# Patient Record
Sex: MALE | Race: WHITE | URBAN - METROPOLITAN AREA
[De-identification: names, ages, dates, MRNs, and addresses within clinical notes are randomized per-mention and may not be internally consistent; named-entity substitution may affect disease eponyms.]

---

## 2017-01-26 PROBLEM — K21.9 ESOPHAGEAL REFLUX: Status: ACTIVE | Noted: 2017-01-26

## 2017-01-28 ENCOUNTER — OFFICE VISIT (OUTPATIENT)
Dept: FAMILY MEDICINE CLINIC | Facility: CLINIC | Age: 37
End: 2017-01-28

## 2017-01-28 VITALS
SYSTOLIC BLOOD PRESSURE: 136 MMHG | DIASTOLIC BLOOD PRESSURE: 98 MMHG | TEMPERATURE: 98 F | WEIGHT: 269 LBS | BODY MASS INDEX: 34.52 KG/M2 | HEIGHT: 74 IN | HEART RATE: 96 BPM

## 2017-01-28 DIAGNOSIS — F17.200 TOBACCO USE DISORDER: ICD-10-CM

## 2017-01-28 DIAGNOSIS — I10 HTN (HYPERTENSION), BENIGN: Primary | ICD-10-CM

## 2017-01-28 PROCEDURE — 99214 OFFICE O/P EST MOD 30 MIN: CPT | Performed by: FAMILY MEDICINE

## 2017-01-28 PROCEDURE — 93000 ELECTROCARDIOGRAM COMPLETE: CPT | Performed by: FAMILY MEDICINE

## 2017-01-28 RX ORDER — METOPROLOL SUCCINATE 25 MG/1
25 TABLET, EXTENDED RELEASE ORAL DAILY
Qty: 30 TABLET | Refills: 2 | Status: SHIPPED | OUTPATIENT
Start: 2017-01-28 | End: 2017-02-17 | Stop reason: DRUGHIGH

## 2017-01-28 NOTE — PROGRESS NOTES
[address]  PROGRESS NOTE  Chief Complaint:   Patient presents with:  Blood Pressure      HPI:   This is a 29year old male coming in for check BP  June 2016 patient was in ED for allergic rx- bp elevated at that time.   Patient did not s itching, skin lesion, or excessive skin dryness. CARDIOVASCULAR:  Denies chest pain, chest pressure, chest discomfort, palpitations, edema, dyspnea on exertion or at rest.  RESPIRATORY:  Denies shortness of breath, wheezing, cough or sputum.   Lloyd Fish Soft, nondistended, nontender, bowel sounds normal in all 4 quadrants, no masses, no hepatosplenomegaly. EXTREMITIES:  No edema, no cyanosis, no clubbing, FROM  ASSESSMENT AND PLAN:   1. HTN (hypertension), benign  New diagnosis.   Patient EKG which was Allergies:  Ciprofloxacin           Pain  Sulfamethoxazole W/*        Comment:Other reaction(s): hives  Penicillins                PHYSICAL EXAM:   Physical Exam           ASSESSMENT/PLAN:   Htn (hypertension), benign  (primary encounter diagnosis)  To

## 2017-01-28 NOTE — PATIENT INSTRUCTIONS
Pt rec to return for fasting labs  Pt rec to start BP medication  Recheck BP in 2-4 weeks with pcp- Dr. Geraldine Rai

## 2017-02-01 ENCOUNTER — TELEPHONE (OUTPATIENT)
Dept: FAMILY MEDICINE CLINIC | Facility: CLINIC | Age: 37
End: 2017-02-01

## 2017-02-01 DIAGNOSIS — I10 HTN (HYPERTENSION), BENIGN: Primary | ICD-10-CM

## 2017-02-01 NOTE — TELEPHONE ENCOUNTER
Patient needs appointment. I am uncertain what labs this patient needs ;  I have not ever seen him. I am uncertain what labs he is thinking he needs or why he scheduled labs. Old chart reviewed;   Patient has never been seen here for a physical

## 2017-02-03 NOTE — TELEPHONE ENCOUNTER
Ok for fasting labs. Pt was instructed to return in 2 weeks for f;u of BP- Please clarify pcp with him ( Dr. Stephenie Armstrong or Dr. Giselle Rankin).

## 2017-02-03 NOTE — TELEPHONE ENCOUNTER
Patient informed of the below recommendations. States he was just in last week regarding his BP and was informed per Dr. JAH Mcrae to return for fasting labs. Patient is going to keep his lab appt for tomorrow unless he hears otherwise from us.   Please advis

## 2017-02-04 ENCOUNTER — LAB ENCOUNTER (OUTPATIENT)
Dept: LAB | Age: 37
End: 2017-02-04
Attending: FAMILY MEDICINE
Payer: COMMERCIAL

## 2017-02-04 DIAGNOSIS — I10 HTN (HYPERTENSION), BENIGN: ICD-10-CM

## 2017-02-04 LAB
ALBUMIN SERPL-MCNC: 4 G/DL (ref 3.5–4.8)
ALP LIVER SERPL-CCNC: 67 U/L (ref 45–117)
ALT SERPL-CCNC: 47 U/L (ref 17–63)
AST SERPL-CCNC: 27 U/L (ref 15–41)
BASOPHILS # BLD AUTO: 0.13 X10(3) UL (ref 0–0.1)
BASOPHILS NFR BLD AUTO: 0.7 %
BILIRUB SERPL-MCNC: 0.4 MG/DL (ref 0.1–2)
BILIRUB UR QL STRIP.AUTO: NEGATIVE
BUN BLD-MCNC: 7 MG/DL (ref 8–20)
CALCIUM BLD-MCNC: 8.9 MG/DL (ref 8.3–10.3)
CHLORIDE: 101 MMOL/L (ref 101–111)
CHOLEST SMN-MCNC: 193 MG/DL (ref ?–200)
CLARITY UR REFRACT.AUTO: CLEAR
CO2: 27 MMOL/L (ref 22–32)
CREAT BLD-MCNC: 0.85 MG/DL (ref 0.7–1.3)
EOSINOPHIL # BLD AUTO: 0.35 X10(3) UL (ref 0–0.3)
EOSINOPHIL NFR BLD AUTO: 1.8 %
ERYTHROCYTE [DISTWIDTH] IN BLOOD BY AUTOMATED COUNT: 12.2 % (ref 11.5–16)
GLUCOSE BLD-MCNC: 82 MG/DL (ref 70–99)
GLUCOSE UR STRIP.AUTO-MCNC: NEGATIVE MG/DL
HCT VFR BLD AUTO: 47.8 % (ref 37–53)
HDLC SERPL-MCNC: 31 MG/DL (ref 45–?)
HDLC SERPL: 6.23 {RATIO} (ref ?–4.97)
HGB BLD-MCNC: 16.5 G/DL (ref 13–17)
IMMATURE GRANULOCYTE COUNT: 0.1 X10(3) UL (ref 0–1)
IMMATURE GRANULOCYTE RATIO %: 0.5 %
KETONES UR STRIP.AUTO-MCNC: NEGATIVE MG/DL
LDLC SERPL CALC-MCNC: 115 MG/DL (ref ?–130)
LEUKOCYTE ESTERASE UR QL STRIP.AUTO: NEGATIVE
LYMPHOCYTES # BLD AUTO: 5.33 X10(3) UL (ref 0.9–4)
LYMPHOCYTES NFR BLD AUTO: 28.2 %
M PROTEIN MFR SERPL ELPH: 8 G/DL (ref 6.1–8.3)
MCH RBC QN AUTO: 32.1 PG (ref 27–33.2)
MCHC RBC AUTO-ENTMCNC: 34.5 G/DL (ref 31–37)
MCV RBC AUTO: 93 FL (ref 80–99)
MONOCYTES # BLD AUTO: 1.02 X10(3) UL (ref 0.1–0.6)
MONOCYTES NFR BLD AUTO: 5.4 %
NEUTROPHIL ABS PRELIM: 12 X10 (3) UL (ref 1.3–6.7)
NEUTROPHILS # BLD AUTO: 12 X10(3) UL (ref 1.3–6.7)
NEUTROPHILS NFR BLD AUTO: 63.4 %
NITRITE UR QL STRIP.AUTO: NEGATIVE
NONHDLC SERPL-MCNC: 162 MG/DL (ref ?–130)
PH UR STRIP.AUTO: 6 [PH] (ref 4.5–8)
PLATELET # BLD AUTO: 332 10(3)UL (ref 150–450)
POTASSIUM SERPL-SCNC: 3.7 MMOL/L (ref 3.6–5.1)
PROT UR STRIP.AUTO-MCNC: NEGATIVE MG/DL
RBC # BLD AUTO: 5.14 X10(6)UL (ref 4.3–5.7)
RED CELL DISTRIBUTION WIDTH-SD: 42 FL (ref 35.1–46.3)
SODIUM SERPL-SCNC: 136 MMOL/L (ref 136–144)
SP GR UR STRIP.AUTO: <1.005 (ref 1–1.03)
TRIGLYCERIDES: 234 MG/DL (ref ?–150)
TSI SER-ACNC: 2.87 MIU/ML (ref 0.35–5.5)
UROBILINOGEN UR STRIP.AUTO-MCNC: <2 MG/DL
VLDL: 47 MG/DL (ref 5–40)
WBC # BLD AUTO: 18.9 X10(3) UL (ref 4–13)

## 2017-02-04 PROCEDURE — 85025 COMPLETE CBC W/AUTO DIFF WBC: CPT

## 2017-02-04 PROCEDURE — 80053 COMPREHEN METABOLIC PANEL: CPT

## 2017-02-04 PROCEDURE — 84443 ASSAY THYROID STIM HORMONE: CPT

## 2017-02-04 PROCEDURE — 81001 URINALYSIS AUTO W/SCOPE: CPT

## 2017-02-04 PROCEDURE — 80061 LIPID PANEL: CPT

## 2017-02-08 ENCOUNTER — TELEPHONE (OUTPATIENT)
Dept: FAMILY MEDICINE CLINIC | Facility: CLINIC | Age: 37
End: 2017-02-08

## 2017-02-08 NOTE — TELEPHONE ENCOUNTER
Future Appointments  Date Time Provider Jm Viramontes   2/17/2017 1:30 PM Stephanie Silva MD [facility] [place]       Pt states he had 2 capped crowns pulled approx 1 month ago- states that  he is still having problems with the L upper side.

## 2017-02-08 NOTE — TELEPHONE ENCOUNTER
----- Message from Suni Jules MD sent at 2/6/2017  3:24 PM CST -----  Labs reviewed  Cbc- with elevated WBC- It is unclear to me why this is. Please check with pt re any illness. Consider acute care appt.   Ua, chem panel, tsh normal  Lipids- elevat

## 2017-02-09 NOTE — TELEPHONE ENCOUNTER
Called [facility]- answering service answered, detailed message relayed re: pt c/o and elevated WBC. Asked for fax # to send CBC, they do not have fax, only e-mail transmission.  They will send message to- Dr. Roderick Gonsales- requested that dentist follow up with

## 2017-02-09 NOTE — TELEPHONE ENCOUNTER
Pt states he was not able to get in with dentist until next Friday. Pt urged to try to get sooner appt. CR informed. Dentist phone #[phone].

## 2017-02-10 NOTE — TELEPHONE ENCOUNTER
Rodriguez Lara states that he did end up getting a call from the dentist yesterday and was then referred to an oral surgeon and was seen.   Initally pt states he was told there was nothing wrong, but today pt states he woke up w/ an abscess in the area pt was concerne

## 2017-02-17 ENCOUNTER — LAB ENCOUNTER (OUTPATIENT)
Dept: LAB | Age: 37
End: 2017-02-17
Attending: FAMILY MEDICINE
Payer: COMMERCIAL

## 2017-02-17 ENCOUNTER — OFFICE VISIT (OUTPATIENT)
Dept: FAMILY MEDICINE CLINIC | Facility: CLINIC | Age: 37
End: 2017-02-17

## 2017-02-17 VITALS
TEMPERATURE: 97 F | HEIGHT: 74 IN | DIASTOLIC BLOOD PRESSURE: 94 MMHG | WEIGHT: 271.63 LBS | HEART RATE: 88 BPM | RESPIRATION RATE: 16 BRPM | BODY MASS INDEX: 34.86 KG/M2 | SYSTOLIC BLOOD PRESSURE: 130 MMHG

## 2017-02-17 DIAGNOSIS — I10 HTN (HYPERTENSION), BENIGN: Primary | ICD-10-CM

## 2017-02-17 DIAGNOSIS — K04.7 DENTAL ABSCESS: ICD-10-CM

## 2017-02-17 DIAGNOSIS — F17.200 TOBACCO USE DISORDER: ICD-10-CM

## 2017-02-17 LAB
BASOPHILS # BLD AUTO: 0.1 X10(3) UL (ref 0–0.1)
BASOPHILS NFR BLD AUTO: 0.6 %
EOSINOPHIL # BLD AUTO: 0.34 X10(3) UL (ref 0–0.3)
EOSINOPHIL NFR BLD AUTO: 2 %
ERYTHROCYTE [DISTWIDTH] IN BLOOD BY AUTOMATED COUNT: 12.7 % (ref 11.5–16)
HCT VFR BLD AUTO: 47.6 % (ref 37–53)
HGB BLD-MCNC: 16.1 G/DL (ref 13–17)
IMMATURE GRANULOCYTE COUNT: 0.09 X10(3) UL (ref 0–1)
IMMATURE GRANULOCYTE RATIO %: 0.5 %
LYMPHOCYTES # BLD AUTO: 3.91 X10(3) UL (ref 0.9–4)
LYMPHOCYTES NFR BLD AUTO: 23.4 %
MCH RBC QN AUTO: 32.1 PG (ref 27–33.2)
MCHC RBC AUTO-ENTMCNC: 33.8 G/DL (ref 31–37)
MCV RBC AUTO: 95 FL (ref 80–99)
MONOCYTES # BLD AUTO: 0.86 X10(3) UL (ref 0.1–0.6)
MONOCYTES NFR BLD AUTO: 5.1 %
NEUTROPHIL ABS PRELIM: 11.41 X10 (3) UL (ref 1.3–6.7)
NEUTROPHILS # BLD AUTO: 11.41 X10(3) UL (ref 1.3–6.7)
NEUTROPHILS NFR BLD AUTO: 68.4 %
PLATELET # BLD AUTO: 335 10(3)UL (ref 150–450)
RBC # BLD AUTO: 5.01 X10(6)UL (ref 4.3–5.7)
RED CELL DISTRIBUTION WIDTH-SD: 43.4 FL (ref 35.1–46.3)
WBC # BLD AUTO: 16.7 X10(3) UL (ref 4–13)

## 2017-02-17 PROCEDURE — 85025 COMPLETE CBC W/AUTO DIFF WBC: CPT

## 2017-02-17 PROCEDURE — 99214 OFFICE O/P EST MOD 30 MIN: CPT | Performed by: FAMILY MEDICINE

## 2017-02-17 RX ORDER — METOPROLOL SUCCINATE 50 MG/1
50 TABLET, EXTENDED RELEASE ORAL DAILY
Qty: 90 TABLET | Refills: 0 | Status: SHIPPED | OUTPATIENT
Start: 2017-02-17 | End: 2017-05-16

## 2017-02-17 RX ORDER — CLINDAMYCIN HYDROCHLORIDE 300 MG/1
300 CAPSULE ORAL 3 TIMES DAILY
COMMUNITY
Start: 2017-02-12 | End: 2017-05-16 | Stop reason: ALTCHOICE

## 2017-02-17 NOTE — PROGRESS NOTES
Jaskaran Gore is a 40year old male. HPI:   Patient presents for recheck of his hypertension.  Pt has been taking medications as instructed, no medication side effects, no home BP monitoring   Pt noticing gettting red in face less, less prominence of n developed, well nourished,in no apparent distress  SKIN: no rashes,no suspicious lesions  HEENT: atraumatic, normocephalic,ears and throat are clear- left upper jaw with healing wound - empty tooth socket.   NECK: supple,no adenopathy,no bruits  LUNGS: renetta

## 2017-02-17 NOTE — PATIENT INSTRUCTIONS
F.u cbc today    Increase BP med to 50 mg metoprolol a day    dw pt weaning/ quitting smoking    Encourage regular sleep, exercise, diet for weight reduction  Planning to Quit Smoking  Your healthcare provider may have told you that you need to give up tob smoking. Talk with your healthcare provider befor using these products.      Quit-smoking products  Many products can help you quit smoking. Some are prescription medicines that help curb your cravings and withdrawal symptoms.  Other products slowly lessen high blood pressure and high cholesterol that put you at increased risk for cardiovascular disease. You’ll have the best chance of success if you join a stop-smoking group and have the support of your doctor, family and friends.      Line up help  · Ask

## 2017-02-18 ENCOUNTER — TELEPHONE (OUTPATIENT)
Dept: FAMILY MEDICINE CLINIC | Facility: CLINIC | Age: 37
End: 2017-02-18

## 2017-02-18 DIAGNOSIS — D72.829 LEUKOCYTOSIS, UNSPECIFIED TYPE: Primary | ICD-10-CM

## 2017-03-21 ENCOUNTER — TELEPHONE (OUTPATIENT)
Dept: FAMILY MEDICINE CLINIC | Facility: CLINIC | Age: 37
End: 2017-03-21

## 2017-03-24 NOTE — TELEPHONE ENCOUNTER
Future Appointments  Date Time Provider Jm Ana M   5/19/2017 4:15 PM Clarence Cabrera MD [facility] [place]       Left message.

## 2017-03-27 NOTE — TELEPHONE ENCOUNTER
Future Appointments  Date Time Provider Jm Viramontes   5/19/2017 4:15 PM Alla Owens MD [facility] [place]     Left message for pt to return my call.

## 2017-04-08 ENCOUNTER — LAB ENCOUNTER (OUTPATIENT)
Dept: LAB | Age: 37
End: 2017-04-08
Attending: FAMILY MEDICINE
Payer: COMMERCIAL

## 2017-04-08 DIAGNOSIS — D72.829 LEUKOCYTOSIS, UNSPECIFIED TYPE: ICD-10-CM

## 2017-04-08 PROCEDURE — 85025 COMPLETE CBC W/AUTO DIFF WBC: CPT

## 2017-04-10 ENCOUNTER — TELEPHONE (OUTPATIENT)
Dept: FAMILY MEDICINE CLINIC | Facility: CLINIC | Age: 37
End: 2017-04-10

## 2017-04-10 NOTE — TELEPHONE ENCOUNTER
----- Message from Ruben Hernandez MD sent at 4/9/2017  6:21 PM CDT -----  Cbc improved and reassuring

## 2017-05-16 ENCOUNTER — OFFICE VISIT (OUTPATIENT)
Dept: FAMILY MEDICINE CLINIC | Facility: CLINIC | Age: 37
End: 2017-05-16

## 2017-05-16 VITALS
BODY MASS INDEX: 35.75 KG/M2 | TEMPERATURE: 98 F | WEIGHT: 275.63 LBS | DIASTOLIC BLOOD PRESSURE: 88 MMHG | HEIGHT: 73.75 IN | HEART RATE: 84 BPM | RESPIRATION RATE: 16 BRPM | SYSTOLIC BLOOD PRESSURE: 126 MMHG

## 2017-05-16 DIAGNOSIS — I10 HTN (HYPERTENSION), BENIGN: Primary | ICD-10-CM

## 2017-05-16 DIAGNOSIS — N50.819 TESTICULAR PAIN: ICD-10-CM

## 2017-05-16 PROCEDURE — 99214 OFFICE O/P EST MOD 30 MIN: CPT | Performed by: FAMILY MEDICINE

## 2017-05-16 RX ORDER — METOPROLOL SUCCINATE 50 MG/1
50 TABLET, EXTENDED RELEASE ORAL DAILY
Qty: 90 TABLET | Refills: 2 | Status: SHIPPED | OUTPATIENT
Start: 2017-05-16 | End: 2018-02-11

## 2017-05-16 NOTE — PATIENT INSTRUCTIONS
Continue BP medications    rec urology evaluation Dr. Kiya Harvey 3 month    F.u dental re oral issues.

## 2017-05-16 NOTE — PROGRESS NOTES
Bertha De Jesus is a 40year old male. HPI:   Patient presents for recheck of his hypertension. Pt has been taking medications as instructed, no medication side effects. Pt with no chest pain or sob. Pt denies dizziness. Pt wwith no gi upset.     Pt w Granulocyte Absolute 0.09 0.00-1.00 x10(3) uL   Neutrophil % 65.9 %   Lymphocyte % 24.8 %   Monocyte % 5.3 %   Eosinophil % 2.7 %   Basophil % 0.6 %   Immature Granulocyte % 0.7 %       Results for orders placed or performed in visit on 04/08/17  -CBC W/ D bruits  LUNGS: clear to auscultation  CARDIO: RRR without murmur   GI: good BS's,no masses, HSM or tenderness  EXTREMITIES: no cyanosis, clubbing or edema    ASSESSMENT AND PLAN:   Pt presents for a recheck of his hypertension.  BP is well controlled, no si

## 2017-06-28 ENCOUNTER — TELEPHONE (OUTPATIENT)
Dept: FAMILY MEDICINE CLINIC | Facility: CLINIC | Age: 37
End: 2017-06-28

## 2017-06-29 NOTE — TELEPHONE ENCOUNTER
Lab done 5/10/17- with [employer]- only one page received, ordered per Dr. Maggie Boyce to get more information. Left message.

## 2018-02-12 RX ORDER — METOPROLOL SUCCINATE 50 MG/1
TABLET, EXTENDED RELEASE ORAL
Qty: 90 TABLET | Refills: 0 | Status: SHIPPED | OUTPATIENT
Start: 2018-02-12 | End: 2018-03-27

## 2018-02-12 NOTE — TELEPHONE ENCOUNTER
Pt called back, scheduled follow up appt.   Future Appointments  Date Time Provider Jm Ana M   3/27/2018 10:15 AM Cameron Velazquez MD [facility]

## 2018-02-28 ENCOUNTER — TELEPHONE (OUTPATIENT)
Dept: FAMILY MEDICINE CLINIC | Facility: CLINIC | Age: 38
End: 2018-02-28

## 2018-02-28 ENCOUNTER — HOSPITAL ENCOUNTER (OUTPATIENT)
Dept: GENERAL RADIOLOGY | Age: 38
Discharge: HOME | End: 2018-02-28
Attending: FAMILY MEDICINE
Payer: COMMERCIAL

## 2018-02-28 ENCOUNTER — APPOINTMENT (OUTPATIENT)
Dept: LAB | Age: 38
End: 2018-02-28
Attending: FAMILY MEDICINE
Payer: COMMERCIAL

## 2018-02-28 ENCOUNTER — OFFICE VISIT (OUTPATIENT)
Dept: FAMILY MEDICINE CLINIC | Facility: CLINIC | Age: 38
End: 2018-02-28

## 2018-02-28 VITALS
RESPIRATION RATE: 16 BRPM | BODY MASS INDEX: 35.73 KG/M2 | DIASTOLIC BLOOD PRESSURE: 98 MMHG | SYSTOLIC BLOOD PRESSURE: 132 MMHG | WEIGHT: 278.38 LBS | HEIGHT: 74 IN | HEART RATE: 80 BPM | TEMPERATURE: 98 F

## 2018-02-28 DIAGNOSIS — R10.12 ABDOMINAL PAIN, LUQ: ICD-10-CM

## 2018-02-28 DIAGNOSIS — R10.12 ABDOMINAL PAIN, LUQ: Primary | ICD-10-CM

## 2018-02-28 DIAGNOSIS — F17.200 TOBACCO USE DISORDER: ICD-10-CM

## 2018-02-28 DIAGNOSIS — K21.9 GASTROESOPHAGEAL REFLUX DISEASE WITHOUT ESOPHAGITIS: ICD-10-CM

## 2018-02-28 LAB
ALBUMIN SERPL-MCNC: 4.1 G/DL (ref 3.5–4.8)
ALP LIVER SERPL-CCNC: 67 U/L (ref 45–117)
ALT SERPL-CCNC: 58 U/L (ref 17–63)
AST SERPL-CCNC: 30 U/L (ref 15–41)
BASOPHILS # BLD AUTO: 0.09 X10(3) UL (ref 0–0.1)
BASOPHILS NFR BLD AUTO: 0.5 %
BILIRUB SERPL-MCNC: 0.4 MG/DL (ref 0.1–2)
BILIRUB UR QL STRIP.AUTO: NEGATIVE
BUN BLD-MCNC: 7 MG/DL (ref 8–20)
CALCIUM BLD-MCNC: 9.2 MG/DL (ref 8.3–10.3)
CHLORIDE: 106 MMOL/L (ref 101–111)
CLARITY UR REFRACT.AUTO: CLEAR
CO2: 23 MMOL/L (ref 22–32)
COLOR UR AUTO: YELLOW
CREAT BLD-MCNC: 0.83 MG/DL (ref 0.7–1.3)
EOSINOPHIL # BLD AUTO: 0.31 X10(3) UL (ref 0–0.3)
EOSINOPHIL NFR BLD AUTO: 1.9 %
ERYTHROCYTE [DISTWIDTH] IN BLOOD BY AUTOMATED COUNT: 12.5 % (ref 11.5–16)
GLUCOSE BLD-MCNC: 75 MG/DL (ref 70–99)
GLUCOSE UR STRIP.AUTO-MCNC: NEGATIVE MG/DL
HCT VFR BLD AUTO: 47.9 % (ref 37–53)
HGB BLD-MCNC: 16.4 G/DL (ref 13–17)
IMMATURE GRANULOCYTE COUNT: 0.09 X10(3) UL (ref 0–1)
IMMATURE GRANULOCYTE RATIO %: 0.5 %
KETONES UR STRIP.AUTO-MCNC: NEGATIVE MG/DL
LEUKOCYTE ESTERASE UR QL STRIP.AUTO: NEGATIVE
LYMPHOCYTES # BLD AUTO: 3.92 X10(3) UL (ref 0.9–4)
LYMPHOCYTES NFR BLD AUTO: 23.7 %
M PROTEIN MFR SERPL ELPH: 8.1 G/DL (ref 6.1–8.3)
MCH RBC QN AUTO: 32 PG (ref 27–33.2)
MCHC RBC AUTO-ENTMCNC: 34.2 G/DL (ref 31–37)
MCV RBC AUTO: 93.4 FL (ref 80–99)
MONOCYTES # BLD AUTO: 0.82 X10(3) UL (ref 0.1–1)
MONOCYTES NFR BLD AUTO: 5 %
NEUTROPHIL ABS PRELIM: 11.32 X10 (3) UL (ref 1.3–6.7)
NEUTROPHILS # BLD AUTO: 11.32 X10(3) UL (ref 1.3–6.7)
NEUTROPHILS NFR BLD AUTO: 68.4 %
NITRITE UR QL STRIP.AUTO: NEGATIVE
PH UR STRIP.AUTO: 5 [PH] (ref 4.5–8)
PLATELET # BLD AUTO: 343 10(3)UL (ref 150–450)
POTASSIUM SERPL-SCNC: 3.9 MMOL/L (ref 3.6–5.1)
PROT UR STRIP.AUTO-MCNC: NEGATIVE MG/DL
RBC # BLD AUTO: 5.13 X10(6)UL (ref 4.3–5.7)
RBC UR QL AUTO: NEGATIVE
RED CELL DISTRIBUTION WIDTH-SD: 43.4 FL (ref 35.1–46.3)
SODIUM SERPL-SCNC: 139 MMOL/L (ref 136–144)
SP GR UR STRIP.AUTO: 1.02 (ref 1–1.03)
UROBILINOGEN UR STRIP.AUTO-MCNC: <2 MG/DL
WBC # BLD AUTO: 16.6 X10(3) UL (ref 4–13)

## 2018-02-28 PROCEDURE — 99214 OFFICE O/P EST MOD 30 MIN: CPT | Performed by: FAMILY MEDICINE

## 2018-02-28 PROCEDURE — 85025 COMPLETE CBC W/AUTO DIFF WBC: CPT | Performed by: FAMILY MEDICINE

## 2018-02-28 PROCEDURE — 80053 COMPREHEN METABOLIC PANEL: CPT | Performed by: FAMILY MEDICINE

## 2018-02-28 PROCEDURE — 81003 URINALYSIS AUTO W/O SCOPE: CPT | Performed by: FAMILY MEDICINE

## 2018-02-28 PROCEDURE — 74021 RADEX ABDOMEN 3+ VIEWS: CPT | Performed by: FAMILY MEDICINE

## 2018-02-28 PROCEDURE — 36415 COLL VENOUS BLD VENIPUNCTURE: CPT | Performed by: FAMILY MEDICINE

## 2018-02-28 PROCEDURE — 74019 RADEX ABDOMEN 2 VIEWS: CPT | Performed by: FAMILY MEDICINE

## 2018-02-28 RX ORDER — OMEPRAZOLE 20 MG/1
20 CAPSULE, DELAYED RELEASE ORAL
Qty: 60 CAPSULE | Refills: 2 | Status: SHIPPED | OUTPATIENT
Start: 2018-02-28 | End: 2018-04-17

## 2018-02-28 NOTE — TELEPHONE ENCOUNTER
----- Message from Cheryl Bolanos MD sent at 2/28/2018  5:22 PM CST -----  Labs reviewed     Liver function testing normal   BS normal - no sign of diabetes. Kidney function normal     WBC mildly elevated.      Recommend continue with plan as discussed

## 2018-02-28 NOTE — TELEPHONE ENCOUNTER
Pt states for last 3-4 days he is feeling a sharp abdominal pain under left side of rib cage. States it is worse right after eating but then calms down and food actually helps after initial pain.  He has been taking pepto 3-4 times per day and says that it

## 2018-02-28 NOTE — TELEPHONE ENCOUNTER
Pt called stating that he is having a sharp abdominal pain. Pt put on schedule to see Dr Jose Rafael Deras at 10:45 this morning.  Call triaged to [facility]

## 2018-03-01 NOTE — TELEPHONE ENCOUNTER
Pt informed of results and recommendations.     Future Appointments  Date Time Provider Jm Viramontes   3/27/2018 10:15 AM Lionel Saucedo MD [facility]

## 2018-03-01 NOTE — PROGRESS NOTES
Chief Complaint:   Patient presents with:  Abdominal Pain: L side under ribs      HPI:   This is a 41year old male coming in for acute discomfort in epigastric and LUQ region. Positive belching and burping with relief of pain.    No diarrhea or constipat Absolute Prelim 11.32 (H) 1.30 - 6.70 x10 (3) uL   Neutrophil Absolute 11.32 (H) 1.30 - 6.70 x10(3) uL   Lymphocyte Absolute 3.92 0.90 - 4.00 x10(3) uL   Monocyte Absolute 0.82 0.10 - 1.00 x10(3) uL   Eosinophil Absolute 0.31 (H) 0.00 - 0.30 x10(3) uL   Ba index is 35.74 kg/m² as calculated from the following:    Height as of this encounter: 74\". Weight as of this encounter: 278 lb 6.4 oz. Vital signs reviewed. Appears stated age, well groomed.     Physical Exam:    GEN:  Patient is alert, awake and orie TuMS or Pepto as needed. Keep appointment for 1 month. Patient/Caregiver Education: Patient/Caregiver Education: There are no barriers to learning. Medical education done. Outcome: Patient verbalizes understanding.  Patient is notified to call

## 2018-03-27 ENCOUNTER — OFFICE VISIT (OUTPATIENT)
Dept: FAMILY MEDICINE CLINIC | Facility: CLINIC | Age: 38
End: 2018-03-27

## 2018-03-27 VITALS
WEIGHT: 275.63 LBS | SYSTOLIC BLOOD PRESSURE: 128 MMHG | DIASTOLIC BLOOD PRESSURE: 70 MMHG | BODY MASS INDEX: 35.37 KG/M2 | TEMPERATURE: 98 F | HEART RATE: 86 BPM | OXYGEN SATURATION: 98 % | RESPIRATION RATE: 14 BRPM | HEIGHT: 74 IN

## 2018-03-27 DIAGNOSIS — F17.200 TOBACCO USE DISORDER: ICD-10-CM

## 2018-03-27 DIAGNOSIS — I10 HTN (HYPERTENSION), BENIGN: Primary | ICD-10-CM

## 2018-03-27 DIAGNOSIS — K29.00 ACUTE SUPERFICIAL GASTRITIS WITHOUT HEMORRHAGE: ICD-10-CM

## 2018-03-27 PROCEDURE — 99213 OFFICE O/P EST LOW 20 MIN: CPT | Performed by: FAMILY MEDICINE

## 2018-03-27 RX ORDER — METOPROLOL SUCCINATE 50 MG/1
TABLET, EXTENDED RELEASE ORAL
Qty: 90 TABLET | Refills: 1 | Status: SHIPPED | OUTPATIENT
Start: 2018-03-27 | End: 2018-11-18

## 2018-03-27 NOTE — PATIENT INSTRUCTIONS
Continue meds    Encourage pt to stop smoking    omeprolzole 1-2 tabs on am ok as needed    Recheck 6 months

## 2018-03-27 NOTE — PROGRESS NOTES
[facility] [place]  PROGRESS NOTE  Chief Complaint:   Patient presents with:  Medication Follow-Up      HPI:   This is a 41year old male coming in for medical followup    HTN-- doing well.   Patient denies any chest pain any shortness of breath Negative Negative   Ketones Urine Negative Negative mg/dL   Blood Urine Negative Negative   pH Urine 5.0 4.5 - 8.0   Protein Urine Negative Negative mg/dl   Urobilinogen Urine <2.0 0.2 - 2.0 mg/dL   Nitrite Urine Negative Negative   Leukocyte Esterase Urin week       Comment: beer occasional social weekends     Drug use: No            Other Topics            Concern  Caffeine Concern        Yes    Comment:12 cans of soda per day  Exercise                No  Seat Belt               Yes  Special Diet enlarged nodes  PSYCHIATRIC:  Denies depression or anxiety. ENDOCRINOLOGIC:  Denies excessive sweating, cold or heat intolerance, polyuria or polydipsia. ALLERGIES:  Denies allergic response, history of asthma, sneezing, hives, eczema or rhinitis.      EX Disp Refills    Metoprolol Succinate ER 50 MG Oral Tablet 24 Hr 90 tablet 1      Sig: TAKE ONE TABLET BY MOUTH ONCE DAILY           Health Maintenance:        Eduardo Gamino MD  3/27/2018  10:13 AM    Patient/Caregiver Education: Patient/Caregiver Educ

## 2018-04-17 ENCOUNTER — OFFICE VISIT (OUTPATIENT)
Dept: FAMILY MEDICINE CLINIC | Facility: CLINIC | Age: 38
End: 2018-04-17

## 2018-04-17 VITALS
BODY MASS INDEX: 35.4 KG/M2 | HEIGHT: 74 IN | WEIGHT: 275.81 LBS | DIASTOLIC BLOOD PRESSURE: 84 MMHG | OXYGEN SATURATION: 98 % | RESPIRATION RATE: 14 BRPM | HEART RATE: 86 BPM | SYSTOLIC BLOOD PRESSURE: 142 MMHG | TEMPERATURE: 98 F

## 2018-04-17 DIAGNOSIS — R10.12 ABDOMINAL PAIN, LEFT UPPER QUADRANT: Primary | ICD-10-CM

## 2018-04-17 PROCEDURE — 99214 OFFICE O/P EST MOD 30 MIN: CPT | Performed by: FAMILY MEDICINE

## 2018-04-17 NOTE — PROGRESS NOTES
Chief Complaint:   Patient presents with:  Pain: Left side cramping pain      HPI:   This is a 41year old male coming in for follow-up care regarding abdominal pain.   Patient states that he took his omeprazole regularly at first wondering if it was improv HCT 47.9 37.0 - 53.0 %   .0 150.0 - 450.0 10(3)uL   MCV 93.4 80.0 - 99.0 fL   MCH 32.0 27.0 - 33.2 pg   MCHC 34.2 31.0 - 37.0 g/dL   RDW 12.5 11.5 - 16.0 %   RDW-SD 43.4 35.1 - 46.3 fL   Neutrophil Absolute Prelim 11.32 (H) 1.30 - 6.70 x10 (3) uL Tablet 24 Hr TAKE ONE TABLET BY MOUTH ONCE DAILY Disp: 90 tablet Rfl: 1        Allergies:    Ciprofloxacin           Pain  Sulfamethoxazole W/*        Comment:Other reaction(s): hives  Penicillins                    REVIEW OF SYSTEMS:   CONSTITUTIONAL:  Krys Lank tenderness in epigastric region and LUQ,  Some deferred tenderness with palpation from the right  , bowel sounds normal in all 4 quadrants,   no masses, no hepatosplenomegaly. BACK: No tenderness,  FROM.   EXTREMITIES:  No edema, no cyanosis,FROM, 2+ enid

## 2018-04-23 ENCOUNTER — TELEPHONE (OUTPATIENT)
Dept: FAMILY MEDICINE CLINIC | Facility: CLINIC | Age: 38
End: 2018-04-23

## 2018-04-23 DIAGNOSIS — R10.12 LUQ PAIN: ICD-10-CM

## 2018-04-23 DIAGNOSIS — K29.00 ACUTE SUPERFICIAL GASTRITIS WITHOUT HEMORRHAGE: Primary | ICD-10-CM

## 2018-04-23 NOTE — TELEPHONE ENCOUNTER
Referral pended, please sign. Patient notified. States he would like referral. Asking for call back once authorized via insurance. Will give information for Dr. Sal Barba office at that time.

## 2018-04-23 NOTE — TELEPHONE ENCOUNTER
Left message to inform pt that CT results have been received but Dr. Shadi Fernando has not had chance to review them. Explained to pt we would call back w/ results as soon as possible. Fax w/ results placed on Dr. Juan Junior desk for review.

## 2018-04-23 NOTE — TELEPHONE ENCOUNTER
CT results reviewed     Unremarkable for showing cause for LUQ pain and hitory of belching. Recommend referral for GI evaluation -     Recommend dr. Donna Grover.

## 2018-04-23 NOTE — TELEPHONE ENCOUNTER
Patient informed that referral was authorized. Given office details for Dr. Reena Nguyen office so he can call office to schedule appt. Faxed copy of CT to Dr. Reena Nguyen office as well.

## 2018-09-26 ENCOUNTER — OFFICE VISIT (OUTPATIENT)
Dept: FAMILY MEDICINE CLINIC | Facility: CLINIC | Age: 38
End: 2018-09-26
Payer: COMMERCIAL

## 2018-09-26 VITALS
OXYGEN SATURATION: 98 % | TEMPERATURE: 98 F | SYSTOLIC BLOOD PRESSURE: 124 MMHG | BODY MASS INDEX: 35.16 KG/M2 | DIASTOLIC BLOOD PRESSURE: 72 MMHG | WEIGHT: 274 LBS | HEART RATE: 74 BPM | RESPIRATION RATE: 16 BRPM | HEIGHT: 74 IN

## 2018-09-26 DIAGNOSIS — Z23 NEED FOR INFLUENZA VACCINATION: ICD-10-CM

## 2018-09-26 DIAGNOSIS — I10 HTN (HYPERTENSION), BENIGN: Primary | ICD-10-CM

## 2018-09-26 DIAGNOSIS — E78.2 MIXED HYPERLIPIDEMIA: ICD-10-CM

## 2018-09-26 DIAGNOSIS — D72.829 LEUKOCYTOSIS, UNSPECIFIED TYPE: ICD-10-CM

## 2018-09-26 DIAGNOSIS — J31.0 RHINITIS, UNSPECIFIED TYPE: ICD-10-CM

## 2018-09-26 PROCEDURE — 99214 OFFICE O/P EST MOD 30 MIN: CPT | Performed by: FAMILY MEDICINE

## 2018-09-26 PROCEDURE — 90471 IMMUNIZATION ADMIN: CPT | Performed by: FAMILY MEDICINE

## 2018-09-26 PROCEDURE — 90686 IIV4 VACC NO PRSV 0.5 ML IM: CPT | Performed by: FAMILY MEDICINE

## 2018-09-26 NOTE — PATIENT INSTRUCTIONS
Pt to have flu vaccine todau    Pt to contne medications    Please return for fasting labs.    If wbc elevated- considering hematology consult    Ok for zyrtec for congestion -daily for 1-2 weeks

## 2018-09-26 NOTE — PROGRESS NOTES
[facility] [place]  PROGRESS NOTE  Chief Complaint:   Patient presents with:  Medication Follow-Up: 6 month f/u, would also like flu shot      HPI:   This is a 41year old male coming in for medical follow-up. Patient generally feeling well.   H 23.0 22.0 - 32.0 mmol/L   URINALYSIS WITH CULTURE REFLEX   Result Value Ref Range    Urine Color Yellow Yellow    Clarity Urine Clear Clear    Spec Gravity 1.016 1.001 - 1.030    Glucose Urine Negative Negative mg/dl    Bilirubin Urine Negative Negative file      Highest education level: Not on file    Social Needs      Financial resource strain: Not on file      Food insecurity - worry: Not on file      Food insecurity - inability: Not on file      Transportation needs - medical: Not on file      Transpo pressure, chest discomfort, palpitations, edema, dyspnea on exertion or at rest.  RESPIRATORY:  Denies shortness of breath, wheezing, cough or sputum. GASTROINTESTINAL:  Denies abdominal pain, nausea, vomiting, constipation, diarrhea, or blood in stool. cyanosis, no clubbing,  NEURO:  No deficit, normal gait, strength and tone, sensory intact, normal reflexes. PSYCH:  Normal mood and affect.  Behavior is normal. Judgement and thought content are normal    ASSESSMENT AND PLAN:   1. HTN (hypertension), magda

## 2018-10-05 ENCOUNTER — TELEPHONE (OUTPATIENT)
Dept: FAMILY MEDICINE CLINIC | Facility: CLINIC | Age: 38
End: 2018-10-05

## 2018-10-05 NOTE — TELEPHONE ENCOUNTER
Pt states he is currently being treated for acute tonsillitis. Per CR, pt urged to wait 2 wks for labs,  appt postponed.     Future Appointments   Date Time Provider Jm Viramontes   10/20/2018  9:00 AM [facility]

## 2018-10-05 NOTE — TELEPHONE ENCOUNTER
patient was seen at urgent care last night and  is currently taking antibiotics - he is scheudled for labs tomorrow morning but wants to know if the abx will affect his blood count

## 2018-10-20 ENCOUNTER — LABORATORY ENCOUNTER (OUTPATIENT)
Dept: LAB | Age: 38
End: 2018-10-20
Attending: FAMILY MEDICINE
Payer: COMMERCIAL

## 2018-10-20 DIAGNOSIS — I10 HTN (HYPERTENSION), BENIGN: ICD-10-CM

## 2018-10-20 DIAGNOSIS — E78.2 MIXED HYPERLIPIDEMIA: ICD-10-CM

## 2018-10-20 DIAGNOSIS — D72.829 LEUKOCYTOSIS, UNSPECIFIED TYPE: ICD-10-CM

## 2018-10-20 PROCEDURE — 81003 URINALYSIS AUTO W/O SCOPE: CPT | Performed by: FAMILY MEDICINE

## 2018-10-20 PROCEDURE — 80050 GENERAL HEALTH PANEL: CPT | Performed by: FAMILY MEDICINE

## 2018-10-20 PROCEDURE — 80061 LIPID PANEL: CPT | Performed by: FAMILY MEDICINE

## 2018-10-20 PROCEDURE — 36415 COLL VENOUS BLD VENIPUNCTURE: CPT | Performed by: FAMILY MEDICINE

## 2018-10-22 ENCOUNTER — TELEPHONE (OUTPATIENT)
Dept: FAMILY MEDICINE CLINIC | Facility: CLINIC | Age: 38
End: 2018-10-22

## 2018-10-22 NOTE — TELEPHONE ENCOUNTER
----- Message from Leila Jurado MD sent at 10/21/2018  7:55 PM CDT -----  Labs reiviewed  Urine- negative  Thyroid normal  Lipids- stable-- encourag exericse to raise HDL  Chem panel- normal  Cbc- elevated WBC- unchanged    Hematology consult advised

## 2018-11-19 RX ORDER — METOPROLOL SUCCINATE 50 MG/1
TABLET, EXTENDED RELEASE ORAL
Qty: 90 TABLET | Refills: 1 | Status: SHIPPED | OUTPATIENT
Start: 2018-11-19 | End: 2019-05-15

## 2018-11-19 NOTE — TELEPHONE ENCOUNTER
RF request from Jami Hernandez for Metoprolol Succinate 50 MG #90. Please advise.      Future appt:    Last Appointment:  9/26/2018; No f/u recommended    Cholesterol, Total (mg/dL)   Date Value   10/20/2018 198     HDL Cholesterol (mg/dL)   Date Value

## 2019-05-15 ENCOUNTER — TELEPHONE (OUTPATIENT)
Dept: FAMILY MEDICINE CLINIC | Facility: CLINIC | Age: 39
End: 2019-05-15

## 2019-05-15 RX ORDER — METOPROLOL SUCCINATE 50 MG/1
TABLET, EXTENDED RELEASE ORAL
Qty: 90 TABLET | Refills: 0 | Status: SHIPPED | OUTPATIENT
Start: 2019-05-15 | End: 2019-08-16

## 2019-05-15 NOTE — TELEPHONE ENCOUNTER
Left message for pt asking him to call the office and schedule an appt. Please advise refill of Metoprolol 50mg.   Last Rx: 18    Future appt:    Last Appointment:  18 with Dr. Megan Shaw for HTN, 18 with Dr. Murrel Apgar for acute issue      [place]

## 2019-05-15 NOTE — TELEPHONE ENCOUNTER
----- Message from Cassandra Mares sent at 5/15/2019 11:39 AM CDT -----  Contact: pt  Pt called back and made appt for 5/29- only has 5 pills left- will needs a refill to get him by until next appt

## 2019-05-29 ENCOUNTER — OFFICE VISIT (OUTPATIENT)
Dept: FAMILY MEDICINE CLINIC | Facility: CLINIC | Age: 39
End: 2019-05-29
Payer: COMMERCIAL

## 2019-05-29 VITALS
SYSTOLIC BLOOD PRESSURE: 122 MMHG | OXYGEN SATURATION: 97 % | TEMPERATURE: 97 F | BODY MASS INDEX: 35.63 KG/M2 | WEIGHT: 277.63 LBS | HEIGHT: 74 IN | HEART RATE: 70 BPM | DIASTOLIC BLOOD PRESSURE: 70 MMHG | RESPIRATION RATE: 16 BRPM

## 2019-05-29 DIAGNOSIS — I10 HTN (HYPERTENSION), BENIGN: Primary | ICD-10-CM

## 2019-05-29 PROCEDURE — 99214 OFFICE O/P EST MOD 30 MIN: CPT | Performed by: FAMILY MEDICINE

## 2019-05-29 NOTE — PROGRESS NOTES
Chief Complaint:   Patient presents with:  Blood Pressure      HPI:   This is a 46year old male coming in for f/u care with htn .      Feeling well   Taking medication regularly                   Results for orders placed or performed in visit on 10/20/18 x10(3) uL    RBC 5.03 4.30 - 5.70 x10(6)uL    HGB 15.8 13.0 - 17.0 g/dL    HCT 47.5 37.0 - 53.0 %    .0 150.0 - 450.0 10(3)uL    MCV 94.4 80.0 - 99.0 fL    MCH 31.4 27.0 - 33.2 pg    MCHC 33.3 31.0 - 37.0 g/dL    RDW 12.7 11.5 - 16.0 %    RDW-SD 43. Current Meds:    Current Outpatient Medications:  Metoprolol Succinate ER 50 MG Oral Tablet 24 Hr TAKE ONE TABLET BY MOUTH ONCE DAILY.  PATIENT NEEDS APPOINTMENT Disp: 90 tablet Rfl: 0        Allergies:    Ciprofloxacin           PAIN  Sulfamethoxaz lesions or ulcerations, good dentition. NECK: Supple,  no JVD, no thyromegaly. SKIN: No rashes, no skin lesion, no bruising, good turgor.     HEART:  Regular rate and rhythm, no murmurs,   LUNGS: Clear to auscultation bilterally, no rales/rhonchi/wheezi

## 2019-08-16 ENCOUNTER — TELEPHONE (OUTPATIENT)
Dept: FAMILY MEDICINE CLINIC | Facility: CLINIC | Age: 39
End: 2019-08-16

## 2019-08-16 RX ORDER — METOPROLOL SUCCINATE 50 MG/1
TABLET, EXTENDED RELEASE ORAL
Qty: 90 TABLET | Refills: 3 | Status: SHIPPED | OUTPATIENT
Start: 2019-08-16 | End: 2019-11-11

## 2019-08-16 NOTE — TELEPHONE ENCOUNTER
Future appt:     Your appointments     Date & Time Appointment Department [facility])    Nov 11, 2019  8:00 AM CST Exam - Established Patient with Ana Davis MD [address] [place] [address], [facility] ([facility])            Jorge Alberto Garcia

## 2019-08-16 NOTE — TELEPHONE ENCOUNTER
RF Metropolol     to  [facility] in [facility]          call to confirm this has been sent and approved

## 2019-11-11 ENCOUNTER — LAB ENCOUNTER (OUTPATIENT)
Dept: LAB | Age: 39
End: 2019-11-11
Attending: FAMILY MEDICINE
Payer: COMMERCIAL

## 2019-11-11 ENCOUNTER — OFFICE VISIT (OUTPATIENT)
Dept: FAMILY MEDICINE CLINIC | Facility: CLINIC | Age: 39
End: 2019-11-11
Payer: COMMERCIAL

## 2019-11-11 VITALS
WEIGHT: 284.38 LBS | HEART RATE: 75 BPM | BODY MASS INDEX: 36.5 KG/M2 | OXYGEN SATURATION: 98 % | RESPIRATION RATE: 16 BRPM | HEIGHT: 74 IN | TEMPERATURE: 97 F | SYSTOLIC BLOOD PRESSURE: 126 MMHG | DIASTOLIC BLOOD PRESSURE: 72 MMHG

## 2019-11-11 DIAGNOSIS — I10 HTN (HYPERTENSION), BENIGN: Primary | ICD-10-CM

## 2019-11-11 DIAGNOSIS — I10 HTN (HYPERTENSION), BENIGN: ICD-10-CM

## 2019-11-11 PROCEDURE — 85025 COMPLETE CBC W/AUTO DIFF WBC: CPT

## 2019-11-11 PROCEDURE — 80053 COMPREHEN METABOLIC PANEL: CPT

## 2019-11-11 PROCEDURE — 99214 OFFICE O/P EST MOD 30 MIN: CPT | Performed by: FAMILY MEDICINE

## 2019-11-11 PROCEDURE — 80061 LIPID PANEL: CPT

## 2019-11-11 PROCEDURE — 36415 COLL VENOUS BLD VENIPUNCTURE: CPT

## 2019-11-11 RX ORDER — METOPROLOL SUCCINATE 50 MG/1
TABLET, EXTENDED RELEASE ORAL
Qty: 90 TABLET | Refills: 3 | Status: SHIPPED | OUTPATIENT
Start: 2019-11-11 | End: 2020-10-05

## 2019-11-11 NOTE — PROGRESS NOTES
Chief Complaint:   Patient presents with: Follow - Up: 6 month f/u      HPI:   This is a 46year old male coming in for follow-up care. Patient has been taking his medication regularly.   I reviewed with him hypertension and heart disease prevention: Anna Urine Negative Negative mg/dL    Blood Urine Negative Negative    pH Urine 6.0 4.5 - 8.0    Protein Urine Negative Negative mg/dl    Urobilinogen Urine <2.0 0.2 - 2.0 mg/dL    Nitrite Urine Negative Negative    Leukocyte Esterase Urine Negative Negative Alcohol use:  Yes        Alcohol/week: 1.0 standard drinks        Types: 1 Cans of beer per week        Comment: beer occasional social weekends       Drug use: No    Other Topics      Concerns:        Caffeine Concern: Yes          12 cans of soda per day 6.4 oz (129 kg). Vital signs reviewed. Appears stated age, well groomed. Physical Exam:  GEN:  Patient is alert, awake and oriented, well developed, well nourished     , no apparent distress.   HEENT:  Head:  Normocephalic, atraumatic   Eyes: EOMI, PERR or changing symptoms. Patient is to call with any side effects or complications from the treatments as a result of today.      Problem List:  Patient Active Problem List:     Tobacco use disorder     Obesity     Irritable colon     Esophageal reflux     HT

## 2019-11-13 ENCOUNTER — TELEPHONE (OUTPATIENT)
Dept: FAMILY MEDICINE CLINIC | Facility: CLINIC | Age: 39
End: 2019-11-13

## 2019-11-13 NOTE — TELEPHONE ENCOUNTER
----- Message from Carmelina Flores MD sent at 11/13/2019  4:58 PM CST -----  Labs reviewed     UA normal     BS, kidney function  - normal     LFTs -mild elevation - limit acetominophen use  , alcohol use -   Recommend recheck in 1 year.         Cholestero Adequate: hears normal conversation without difficulty

## 2020-10-05 ENCOUNTER — OFFICE VISIT (OUTPATIENT)
Dept: FAMILY MEDICINE CLINIC | Facility: CLINIC | Age: 40
End: 2020-10-05
Payer: COMMERCIAL

## 2020-10-05 ENCOUNTER — LAB ENCOUNTER (OUTPATIENT)
Dept: LAB | Age: 40
End: 2020-10-05
Attending: FAMILY MEDICINE
Payer: COMMERCIAL

## 2020-10-05 VITALS
HEART RATE: 71 BPM | TEMPERATURE: 97 F | HEIGHT: 74 IN | OXYGEN SATURATION: 97 % | DIASTOLIC BLOOD PRESSURE: 80 MMHG | WEIGHT: 283.63 LBS | SYSTOLIC BLOOD PRESSURE: 130 MMHG | RESPIRATION RATE: 16 BRPM | BODY MASS INDEX: 36.4 KG/M2

## 2020-10-05 DIAGNOSIS — I10 HTN (HYPERTENSION), BENIGN: ICD-10-CM

## 2020-10-05 DIAGNOSIS — Z00.00 HEALTH CARE MAINTENANCE: Primary | ICD-10-CM

## 2020-10-05 DIAGNOSIS — F17.200 TOBACCO USE DISORDER: ICD-10-CM

## 2020-10-05 DIAGNOSIS — E78.2 MIXED HYPERLIPIDEMIA: ICD-10-CM

## 2020-10-05 DIAGNOSIS — Z00.00 HEALTH CARE MAINTENANCE: ICD-10-CM

## 2020-10-05 PROBLEM — J31.0 RHINITIS: Status: RESOLVED | Noted: 2018-09-26 | Resolved: 2020-10-05

## 2020-10-05 PROBLEM — K29.00 ACUTE SUPERFICIAL GASTRITIS WITHOUT HEMORRHAGE: Status: RESOLVED | Noted: 2018-03-27 | Resolved: 2020-10-05

## 2020-10-05 PROBLEM — D72.829 LEUKOCYTOSIS: Status: RESOLVED | Noted: 2018-09-26 | Resolved: 2020-10-05

## 2020-10-05 PROBLEM — K04.7 DENTAL ABSCESS: Status: RESOLVED | Noted: 2017-02-17 | Resolved: 2020-10-05

## 2020-10-05 PROCEDURE — 80053 COMPREHEN METABOLIC PANEL: CPT | Performed by: FAMILY MEDICINE

## 2020-10-05 PROCEDURE — 85025 COMPLETE CBC W/AUTO DIFF WBC: CPT | Performed by: FAMILY MEDICINE

## 2020-10-05 PROCEDURE — 36415 COLL VENOUS BLD VENIPUNCTURE: CPT | Performed by: FAMILY MEDICINE

## 2020-10-05 PROCEDURE — 3075F SYST BP GE 130 - 139MM HG: CPT | Performed by: FAMILY MEDICINE

## 2020-10-05 PROCEDURE — 81003 URINALYSIS AUTO W/O SCOPE: CPT

## 2020-10-05 PROCEDURE — 3008F BODY MASS INDEX DOCD: CPT | Performed by: FAMILY MEDICINE

## 2020-10-05 PROCEDURE — 80061 LIPID PANEL: CPT | Performed by: FAMILY MEDICINE

## 2020-10-05 PROCEDURE — 3079F DIAST BP 80-89 MM HG: CPT | Performed by: FAMILY MEDICINE

## 2020-10-05 PROCEDURE — 99395 PREV VISIT EST AGE 18-39: CPT | Performed by: FAMILY MEDICINE

## 2020-10-05 RX ORDER — METOPROLOL SUCCINATE 50 MG/1
TABLET, EXTENDED RELEASE ORAL
Qty: 90 TABLET | Refills: 3 | Status: SHIPPED | OUTPATIENT
Start: 2020-10-05 | End: 2021-11-15

## 2020-10-05 NOTE — PATIENT INSTRUCTIONS
Good exam     Obtain labs today       Refocus on healthy nutrition and stay active. Smoking cessation     Refills sent in. Recheck in 1 year.

## 2020-10-05 NOTE — PROGRESS NOTES
Chief Complaint:   Patient presents with:  Physical  Refill Request: Metoprolol      HPI:   This is a 45year old male coming in for healthcare check. I reviewed with him chronic illnesses including hypertension and nicotine dependence.     Patient states 8.0    Protein Urine Negative Negative mg/dl    Urobilinogen Urine <2.0 0.2 - 2.0 mg/dL    Nitrite Urine Negative Negative    Leukocyte Esterase Urine Negative Negative    WBC Urine 1-4 <5 /HPF    RBC URINE 0-2 0 - 2 /HPF    Bacteria Urine None Seen None S Spouse name: Not on file      Number of children: 3      Years of education: Not on file      Highest education level: Not on file    Tobacco Use      Smoking status: Current Every Day Smoker        Packs/day: 1.00        Years: 21.00        Pack years: 24 130/80   Pulse 71   Temp 97.2 °F (36.2 °C) (Temporal)   Resp 16   Ht 74\"   Wt 283 lb 9.6 oz (128.6 kg)   SpO2 97%   BMI 36.41 kg/m²  Estimated body mass index is 36.41 kg/m² as calculated from the following:    Height as of this encounter: 74\".     Weight Signed Prescriptions Disp Refills   • Metoprolol Succinate ER 50 MG Oral Tablet 24 Hr 90 tablet 3     Sig: TAKE ONE TABLET BY MOUTH ONCE DAILY. NEEDS APPOINTMENT BEFORE ANY FURTHER REFILLS.        Patient Instructions   Good exam     Obtain labs today

## 2020-10-12 ENCOUNTER — TELEPHONE (OUTPATIENT)
Dept: FAMILY MEDICINE CLINIC | Facility: CLINIC | Age: 40
End: 2020-10-12

## 2020-10-12 NOTE — TELEPHONE ENCOUNTER
Pt has visit and labs done on 10/5/20. TR out of office, please advise on results. Left message for pt- will await lab review.

## 2020-10-12 NOTE — TELEPHONE ENCOUNTER
Dr. Rebecca Chavez is out of the office today. Please let patient know that cholesterol levels are not improved from last year.  Needs to improve diet and increase aerobic exercise to 30 minutes daily with a goal of 150 minutes weekly or start statin therapy

## 2021-05-08 NOTE — TELEPHONE ENCOUNTER
----- Message from Lenora Townsend MD sent at 2/17/2017  9:58 PM CST -----  Reviewed  Wbc some improvement but still elevated.  Pt to continue care if oral surgeon/ dentist
Left message.
Pt informed, states he will f/u w/ dentist on Monday. Stated she would be done with antibiotics tomorrow. Should pt f/u w/ another CBC?
Pt informed.  Stated he would call back on Monday after he calls the dentist.
Recheck cbc in a month- sooner if rec by dentist.
Alert and oriented to person, place and time

## 2021-11-15 RX ORDER — METOPROLOL SUCCINATE 50 MG/1
TABLET, EXTENDED RELEASE ORAL
Qty: 90 TABLET | Refills: 0 | Status: SHIPPED | OUTPATIENT
Start: 2021-11-15 | End: 2022-01-07

## 2021-11-15 NOTE — TELEPHONE ENCOUNTER
Future appt: Your appointments     Date & Time Appointment Department [facility])    Jan 07, 2022  3:00 PM CST Follow up - Extended with Kimmy Salazar MD [address] [facility], [facility]            [address] [facility]  [place] [phone]  [phone]        Last Appointment with provider: 10/5/20 for annual physical.  Last appointment at [facility]:  Visit date not found  Cholesterol, Total (mg/dL)   Date Value   10/05/2020 189     HDL Cholesterol (mg/dL)   Date Value   10/05/2020 29 (L)     LDL Cholesterol (mg/dL)   Date Value   10/05/2020 112 (H)     Triglycerides (mg/dL)   Date Value   10/05/2020 240 (H)     No results found for: EAG, A1C  Lab Results   Component Value Date    TSH 2.870 10/20/2018       No follow-ups on file.

## 2021-11-15 NOTE — TELEPHONE ENCOUNTER
Refill :  Metoprolol  - Call into [facility]   Patient only has 1 ill left    - Please call patient when done     Your appointments     Date & Time Appointment Department [facility])    Jan 07, 2022  3:00 PM CST Follow up - Extended with Shazia Padilla MD [address] [place] [address] [facility] ([facility])            [address] [place] [address]  [place] [phone] [phone]

## 2022-01-07 ENCOUNTER — OFFICE VISIT (OUTPATIENT)
Dept: FAMILY MEDICINE CLINIC | Facility: CLINIC | Age: 42
End: 2022-01-07
Payer: COMMERCIAL

## 2022-01-07 VITALS
HEIGHT: 74 IN | RESPIRATION RATE: 16 BRPM | OXYGEN SATURATION: 99 % | HEART RATE: 64 BPM | DIASTOLIC BLOOD PRESSURE: 84 MMHG | TEMPERATURE: 97 F | BODY MASS INDEX: 34.99 KG/M2 | SYSTOLIC BLOOD PRESSURE: 122 MMHG | WEIGHT: 272.63 LBS

## 2022-01-07 DIAGNOSIS — F32.9 REACTIVE DEPRESSION (SITUATIONAL): ICD-10-CM

## 2022-01-07 DIAGNOSIS — I10 HTN (HYPERTENSION), BENIGN: Primary | ICD-10-CM

## 2022-01-07 PROCEDURE — 3074F SYST BP LT 130 MM HG: CPT | Performed by: FAMILY MEDICINE

## 2022-01-07 PROCEDURE — 99214 OFFICE O/P EST MOD 30 MIN: CPT | Performed by: FAMILY MEDICINE

## 2022-01-07 PROCEDURE — 3008F BODY MASS INDEX DOCD: CPT | Performed by: FAMILY MEDICINE

## 2022-01-07 PROCEDURE — 3079F DIAST BP 80-89 MM HG: CPT | Performed by: FAMILY MEDICINE

## 2022-01-07 RX ORDER — SERTRALINE HYDROCHLORIDE 100 MG/1
100 TABLET, FILM COATED ORAL DAILY
COMMUNITY
Start: 2021-12-26

## 2022-01-07 RX ORDER — METOPROLOL SUCCINATE 50 MG/1
TABLET, EXTENDED RELEASE ORAL
Qty: 90 TABLET | Refills: 3 | Status: SHIPPED | OUTPATIENT
Start: 2022-01-07

## 2022-01-07 NOTE — PROGRESS NOTES
Chief Complaint:   Patient presents with:  Medication Follow-Up      HPI:   This is a 37year old male coming in for follow-up care. Patient with a lot of stresses in the last year.   Has been seeing counseling and on medication feels that he is doing be REFILLS.  90 tablet 3        Allergies:    Ciprofloxacin           PAIN  Sulfamethoxazole W/*        Comment:Other reaction(s): hives  Penicillins                    REVIEW OF SYSTEMS:   CONSTITUTIONAL:  Denies , fever, chills, or fatigue,  EENT:  Eyes:  [place] no skin lesion, no bruising, good turgor. HEART:  Regular rate and rhythm, no murmurs,     LUNGS: Clear to auscultation bilterally, no rales/rhonchi/wheezing.     ABDOMEN:  Soft, nondistended, nontender, bowel sounds normal in all 4 quadrants, no masses,

## 2022-01-12 ENCOUNTER — TELEPHONE (OUTPATIENT)
Dept: FAMILY MEDICINE CLINIC | Facility: CLINIC | Age: 42
End: 2022-01-12

## 2022-01-12 NOTE — TELEPHONE ENCOUNTER
tested 1/11  + COVID    sxs started Monday      was seen here on 1/7   (  just informing us as his information says to contact anyone that he has had contact with in the last 10 days )     no call back necessary

## 2022-09-28 ENCOUNTER — OFFICE VISIT (OUTPATIENT)
Dept: FAMILY MEDICINE CLINIC | Facility: CLINIC | Age: 42
End: 2022-09-28

## 2022-09-28 VITALS
HEART RATE: 60 BPM | WEIGHT: 279.38 LBS | SYSTOLIC BLOOD PRESSURE: 118 MMHG | HEIGHT: 75 IN | TEMPERATURE: 98 F | OXYGEN SATURATION: 97 % | DIASTOLIC BLOOD PRESSURE: 72 MMHG | RESPIRATION RATE: 18 BRPM | BODY MASS INDEX: 34.74 KG/M2

## 2022-09-28 DIAGNOSIS — Z00.00 HEALTH CARE MAINTENANCE: Primary | ICD-10-CM

## 2022-09-28 DIAGNOSIS — I10 HTN (HYPERTENSION), BENIGN: ICD-10-CM

## 2022-09-28 DIAGNOSIS — E78.2 MIXED HYPERLIPIDEMIA: ICD-10-CM

## 2022-09-28 PROBLEM — K21.9 ESOPHAGEAL REFLUX: Status: RESOLVED | Noted: 2017-01-26 | Resolved: 2022-09-28

## 2022-09-28 PROCEDURE — 99396 PREV VISIT EST AGE 40-64: CPT | Performed by: FAMILY MEDICINE

## 2022-09-28 PROCEDURE — 3078F DIAST BP <80 MM HG: CPT | Performed by: FAMILY MEDICINE

## 2022-09-28 PROCEDURE — 3008F BODY MASS INDEX DOCD: CPT | Performed by: FAMILY MEDICINE

## 2022-09-28 PROCEDURE — 3074F SYST BP LT 130 MM HG: CPT | Performed by: FAMILY MEDICINE

## 2022-09-28 RX ORDER — METOPROLOL SUCCINATE 50 MG/1
TABLET, EXTENDED RELEASE ORAL
Qty: 90 TABLET | Refills: 3 | Status: SHIPPED | OUTPATIENT
Start: 2022-09-28

## 2022-09-28 RX ORDER — SERTRALINE HYDROCHLORIDE 100 MG/1
100 TABLET, FILM COATED ORAL DAILY
Qty: 90 TABLET | Refills: 3 | Status: SHIPPED | OUTPATIENT
Start: 2022-09-28

## (undated) NOTE — MR AVS SNAPSHOT
[address] [place]  [address] [phone]  [phone]               Thank you for choosing us for your health care visit with Frank Cullen MD.  We are glad to serve you and happy to provide you with this summary These medications were sent to [facility] [address], [address] [facility], [address]  [address], [place] [address]     Phone:  [phone]    - Metoprolol Succinate ER 50 MG Tb24            MyChart                  Visit ED

## (undated) NOTE — Clinical Note
[address] [facility], [facility]  [address] [phone]            March 29, 2017      [address]      Dear Mariluz Minaya:     Our office has been trying to contact you

## (undated) NOTE — MR AVS SNAPSHOT
[address] [place]  [address] [phone]  [phone]               Thank you for choosing us for your health care visit with Rios Fang MD.  We are glad to serve you and happy to provide you with this summary Finding something to replace cigarettes may be hard to do.  Be aware that some things you choose may be as harmful as cigarettes:  · Smokeless (chewing) tobacco is just as harmful as regular tobacco. Tobacco should not be used as a substitute for cigarettes professional's instructions. Tips for Quitting Smoking (Cardiovascular)  Quitting smoking is a gift to yourself, one of the best things you can do to keep your heart disease from getting worse.  Smoking reduces oxygen flow to your heart by speeding t · Speak to smokers around you about your intent to stop smoking so they can show consideration for you and limit their smoking around you. · Valentina High a list of  “quit benefits” in the spot where you smoke.  Put one on the refrigerator and one on your car dashb

## (undated) NOTE — MR AVS SNAPSHOT
[address] [place]  [address] [phone]  [phone]               Thank you for choosing us for your health care visit with Papo Maria MD.  We are glad to serve you and happy to provide you with this summary Follow-up Instructions     Return in about 4 weeks (around 2/25/2017).          Results of Recent Testing     ELECTROCARDIOGRAM, COMPLETE             MyChart               Educational Information     Your blood pressure indicates you may be at-risk for H Tips for increasing your physical activity – Adults who are physically active are less likely to develop some chronic diseases than adults who are inactive.      HOW TO GET STARTED: HOW TO STAY MOTIVATED:   Start activities slowly and build up over time Do

## (undated) NOTE — LETTER
[address] [facility], [place] [address] [phone]            June 30, 2017      [address]      Dear Stephon Wallace:     Our office has been trying to contact you